# Patient Record
Sex: FEMALE | Race: BLACK OR AFRICAN AMERICAN | Employment: FULL TIME | URBAN - METROPOLITAN AREA
[De-identification: names, ages, dates, MRNs, and addresses within clinical notes are randomized per-mention and may not be internally consistent; named-entity substitution may affect disease eponyms.]

---

## 2020-09-10 ENCOUNTER — HOSPITAL ENCOUNTER (OUTPATIENT)
Age: 35
Setting detail: OBSERVATION
Discharge: HOME | End: 2020-09-11
Attending: EMERGENCY MEDICINE | Admitting: HOSPITALIST
Payer: COMMERCIAL

## 2020-09-10 ENCOUNTER — APPOINTMENT (OUTPATIENT)
Dept: ULTRASOUND IMAGING | Age: 35
End: 2020-09-10
Attending: PHYSICIAN ASSISTANT
Payer: COMMERCIAL

## 2020-09-10 DIAGNOSIS — D64.9 SYMPTOMATIC ANEMIA: Primary | ICD-10-CM

## 2020-09-10 DIAGNOSIS — N93.8 DUB (DYSFUNCTIONAL UTERINE BLEEDING): ICD-10-CM

## 2020-09-10 DIAGNOSIS — D50.9 IRON DEFICIENCY ANEMIA, UNSPECIFIED IRON DEFICIENCY ANEMIA TYPE: ICD-10-CM

## 2020-09-10 LAB
ALBUMIN SERPL-MCNC: 3.3 G/DL (ref 3.4–5)
ALBUMIN/GLOB SERPL: 0.8 {RATIO} (ref 0.8–1.7)
ALP SERPL-CCNC: 54 U/L (ref 45–117)
ALT SERPL-CCNC: 31 U/L (ref 13–56)
ANION GAP SERPL CALC-SCNC: 7 MMOL/L (ref 3–18)
APPEARANCE UR: ABNORMAL
AST SERPL-CCNC: 23 U/L (ref 10–38)
ATRIAL RATE: 97 BPM
BACTERIA URNS QL MICRO: ABNORMAL /HPF
BASOPHILS # BLD: 0.1 K/UL (ref 0–0.1)
BASOPHILS NFR BLD: 1 % (ref 0–2)
BILIRUB SERPL-MCNC: 0.3 MG/DL (ref 0.2–1)
BILIRUB UR QL: NEGATIVE
BUN SERPL-MCNC: 11 MG/DL (ref 7–18)
BUN/CREAT SERPL: 15 (ref 12–20)
CALCIUM SERPL-MCNC: 8.3 MG/DL (ref 8.5–10.1)
CALCULATED P AXIS, ECG09: 61 DEGREES
CALCULATED R AXIS, ECG10: 20 DEGREES
CALCULATED T AXIS, ECG11: 15 DEGREES
CHLORIDE SERPL-SCNC: 107 MMOL/L (ref 100–111)
CK MB CFR SERPL CALC: NORMAL % (ref 0–4)
CK MB SERPL-MCNC: <1 NG/ML (ref 5–25)
CK SERPL-CCNC: 102 U/L (ref 26–192)
CO2 SERPL-SCNC: 23 MMOL/L (ref 21–32)
COLOR UR: ABNORMAL
CREAT SERPL-MCNC: 0.75 MG/DL (ref 0.6–1.3)
DIAGNOSIS, 93000: NORMAL
DIFFERENTIAL METHOD BLD: ABNORMAL
EOSINOPHIL # BLD: 0.1 K/UL (ref 0–0.4)
EOSINOPHIL NFR BLD: 1 % (ref 0–5)
EPITH CASTS URNS QL MICRO: ABNORMAL /LPF (ref 0–5)
ERYTHROCYTE [DISTWIDTH] IN BLOOD BY AUTOMATED COUNT: 20 % (ref 11.6–14.5)
GLOBULIN SER CALC-MCNC: 3.9 G/DL (ref 2–4)
GLUCOSE SERPL-MCNC: 121 MG/DL (ref 74–99)
GLUCOSE UR STRIP.AUTO-MCNC: NEGATIVE MG/DL
HCG SERPL QL: NEGATIVE
HCT VFR BLD AUTO: 19.3 % (ref 35–45)
HCT VFR BLD AUTO: 21 % (ref 35–45)
HGB BLD-MCNC: 6 G/DL (ref 12–16)
HGB BLD-MCNC: 6.6 G/DL (ref 12–16)
HGB UR QL STRIP: ABNORMAL
HISTORY CHECKED?,CKHIST: NORMAL
HISTORY CHECKED?,CKHIST: NORMAL
KETONES UR QL STRIP.AUTO: NEGATIVE MG/DL
LEUKOCYTE ESTERASE UR QL STRIP.AUTO: NEGATIVE
LYMPHOCYTES # BLD: 1.6 K/UL (ref 0.9–3.6)
LYMPHOCYTES NFR BLD: 24 % (ref 21–52)
MCH RBC QN AUTO: 22.5 PG (ref 24–34)
MCHC RBC AUTO-ENTMCNC: 31.1 G/DL (ref 31–37)
MCV RBC AUTO: 72.3 FL (ref 74–97)
MONOCYTES # BLD: 0.5 K/UL (ref 0.05–1.2)
MONOCYTES NFR BLD: 7 % (ref 3–10)
MUCOUS THREADS URNS QL MICRO: ABNORMAL /LPF
NEUTS SEG # BLD: 4.2 K/UL (ref 1.8–8)
NEUTS SEG NFR BLD: 67 % (ref 40–73)
NITRITE UR QL STRIP.AUTO: NEGATIVE
P-R INTERVAL, ECG05: 132 MS
PH UR STRIP: 5 [PH] (ref 5–8)
PLATELET # BLD AUTO: 342 K/UL (ref 135–420)
PLATELET COMMENTS,PCOM: ABNORMAL
PMV BLD AUTO: 9 FL (ref 9.2–11.8)
POTASSIUM SERPL-SCNC: 4.1 MMOL/L (ref 3.5–5.5)
PROT SERPL-MCNC: 7.2 G/DL (ref 6.4–8.2)
PROT UR STRIP-MCNC: 30 MG/DL
Q-T INTERVAL, ECG07: 382 MS
QRS DURATION, ECG06: 80 MS
QTC CALCULATION (BEZET), ECG08: 485 MS
RBC # BLD AUTO: 2.67 M/UL (ref 4.2–5.3)
RBC #/AREA URNS HPF: ABNORMAL /HPF (ref 0–5)
RBC MORPH BLD: ABNORMAL
RETICS/RBC NFR AUTO: 2 % (ref 0.5–2.3)
SODIUM SERPL-SCNC: 137 MMOL/L (ref 136–145)
SP GR UR REFRACTOMETRY: 0 (ref 1–1.03)
SP GR UR REFRACTOMETRY: 1.02 (ref 1–1.03)
TROPONIN I SERPL-MCNC: <0.02 NG/ML (ref 0–0.04)
TSH SERPL DL<=0.05 MIU/L-ACNC: 2.21 UIU/ML (ref 0.36–3.74)
UROBILINOGEN UR QL STRIP.AUTO: 0.2 EU/DL (ref 0.2–1)
VENTRICULAR RATE, ECG03: 97 BPM
WBC # BLD AUTO: 6.5 K/UL (ref 4.6–13.2)
WBC URNS QL MICRO: ABNORMAL /HPF (ref 0–5)

## 2020-09-10 PROCEDURE — 86923 COMPATIBILITY TEST ELECTRIC: CPT

## 2020-09-10 PROCEDURE — 99218 HC RM OBSERVATION: CPT

## 2020-09-10 PROCEDURE — 96374 THER/PROPH/DIAG INJ IV PUSH: CPT

## 2020-09-10 PROCEDURE — 82550 ASSAY OF CK (CPK): CPT

## 2020-09-10 PROCEDURE — 85025 COMPLETE CBC W/AUTO DIFF WBC: CPT

## 2020-09-10 PROCEDURE — 82607 VITAMIN B-12: CPT

## 2020-09-10 PROCEDURE — 86900 BLOOD TYPING SEROLOGIC ABO: CPT

## 2020-09-10 PROCEDURE — 84443 ASSAY THYROID STIM HORMONE: CPT

## 2020-09-10 PROCEDURE — P9016 RBC LEUKOCYTES REDUCED: HCPCS

## 2020-09-10 PROCEDURE — 80053 COMPREHEN METABOLIC PANEL: CPT

## 2020-09-10 PROCEDURE — P9040 RBC LEUKOREDUCED IRRADIATED: HCPCS

## 2020-09-10 PROCEDURE — 85045 AUTOMATED RETICULOCYTE COUNT: CPT

## 2020-09-10 PROCEDURE — 74011250637 HC RX REV CODE- 250/637: Performed by: HOSPITALIST

## 2020-09-10 PROCEDURE — 86920 COMPATIBILITY TEST SPIN: CPT

## 2020-09-10 PROCEDURE — 82728 ASSAY OF FERRITIN: CPT

## 2020-09-10 PROCEDURE — 81001 URINALYSIS AUTO W/SCOPE: CPT

## 2020-09-10 PROCEDURE — 36430 TRANSFUSION BLD/BLD COMPNT: CPT

## 2020-09-10 PROCEDURE — 84703 CHORIONIC GONADOTROPIN ASSAY: CPT

## 2020-09-10 PROCEDURE — 74011250636 HC RX REV CODE- 250/636: Performed by: PHYSICIAN ASSISTANT

## 2020-09-10 PROCEDURE — 76830 TRANSVAGINAL US NON-OB: CPT

## 2020-09-10 PROCEDURE — 99285 EMERGENCY DEPT VISIT HI MDM: CPT

## 2020-09-10 PROCEDURE — 93005 ELECTROCARDIOGRAM TRACING: CPT

## 2020-09-10 PROCEDURE — 83540 ASSAY OF IRON: CPT

## 2020-09-10 PROCEDURE — 85018 HEMOGLOBIN: CPT

## 2020-09-10 RX ORDER — ONDANSETRON 2 MG/ML
4 INJECTION INTRAMUSCULAR; INTRAVENOUS
Status: COMPLETED | OUTPATIENT
Start: 2020-09-10 | End: 2020-09-10

## 2020-09-10 RX ORDER — SODIUM CHLORIDE 9 MG/ML
250 INJECTION, SOLUTION INTRAVENOUS AS NEEDED
Status: DISCONTINUED | OUTPATIENT
Start: 2020-09-10 | End: 2020-09-11 | Stop reason: HOSPADM

## 2020-09-10 RX ORDER — LANOLIN ALCOHOL/MO/W.PET/CERES
1 CREAM (GRAM) TOPICAL
Status: DISCONTINUED | OUTPATIENT
Start: 2020-09-10 | End: 2020-09-11 | Stop reason: HOSPADM

## 2020-09-10 RX ADMIN — FERROUS SULFATE TAB 325 MG (65 MG ELEMENTAL FE) 325 MG: 325 (65 FE) TAB at 21:06

## 2020-09-10 RX ADMIN — ONDANSETRON 4 MG: 2 INJECTION INTRAMUSCULAR; INTRAVENOUS at 13:31

## 2020-09-10 RX ADMIN — SODIUM CHLORIDE 1000 ML: 900 INJECTION, SOLUTION INTRAVENOUS at 13:31

## 2020-09-10 NOTE — ED PROVIDER NOTES
EMERGENCY DEPARTMENT HISTORY AND PHYSICAL EXAM    Date: 9/10/2020  Patient Name: Marcos Gorman    History of Presenting Illness     Chief Complaint   Patient presents with    Syncope         History Provided By: Patient    Chief Complaint: syncope    HPI(Context):   1:01 PM  Marcos Gorman is a 38 y.o. female with PMHX of irregular menses and Fe anemiawho presents to the emergency department C/O syncopal episode. Associated sxs include nausea and fatigue. Pt reports while training at work she began to fell generalized weakness and nausea. She reports syncopal episode with co-workers catching her and laying her on floor. Pt reports a few seconds of LOC. No head trauma. Pt reports irregular periods with daily vaginal bleeding for past 3 weeks. Heavy with clots this week but has since slowed down. Pt is not on birth control. Pt has hx of Fe def anemia but no hx of transfusion. 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 Pt denies fever, chills, vomiting, headache, numbness, unilateral weakness, tingling, chest pain, SOB, abdominal pain, LE swelling, hx of cardiac problems, seizures, incontinence, tongue biting, and any other sxs or complaints. Pt reports 1-2 cigs a month. Drinks 2-3 times a months on weekends typically 4-5 beers. No illicit drug use. Pt has appt to see GYN in [place] at 1:45pm tomorrow at [facility]. Dr. Rosina Archibald    PCP: None        Past History     Past Medical History:  History reviewed. No pertinent past medical history. Past Surgical History:  History reviewed. No pertinent surgical history. Family History:  History reviewed. No pertinent family history. Social History:  Social History     Tobacco Use    Smoking status: Current Some Day Smoker    Smokeless tobacco: Never Used   Substance Use Topics    Alcohol use: Yes    Drug use: Not on file       Allergies:   Allergies   Allergen Reactions    Pcn [Penicillins] Seizures Review of Systems   Review of Systems   Constitutional: Positive for fatigue. Negative for chills and fever. Respiratory: Negative for cough and shortness of breath. Cardiovascular: Negative for chest pain. Gastrointestinal: Positive for nausea. Negative for abdominal pain and vomiting. Genitourinary: Positive for menstrual problem (hx of irregular periods, vaginal bleeding x 3 weeks) and vaginal bleeding. Negative for dysuria and frequency. Neurological: Positive for syncope, weakness (generalized) and light-headedness. Negative for seizures and headaches. Hematological: Does not bruise/bleed easily. All other systems reviewed and are negative. Physical Exam     Vitals:    09/10/20 1300 09/10/20 1329   BP: 134/69 (!) 120/91   Pulse: 96 98   Resp: 12    Temp: 98.5 °F (36.9 °C)    SpO2: 100%    Weight: 68 kg (150 lb)    Height: 5' 2\" (1.575 m)      Physical Exam  Vitals signs and nursing note reviewed. Constitutional:       General: She is not in acute distress. Appearance: She is well-developed. She is not diaphoretic. Comments: AA female in NAD. Alert. HENT:      Head: Normocephalic and atraumatic. Jaw: No trismus. Right Ear: External ear normal.      Left Ear: External ear normal.      Nose: Nose normal.      Mouth/Throat:      Pharynx: Uvula midline. No oropharyngeal exudate or uvula swelling. Tonsils: No tonsillar abscesses. Eyes:      General: No scleral icterus. Right eye: No discharge. Left eye: No discharge. Conjunctiva/sclera: Conjunctivae normal.      Pupils: Pupils are equal, round, and reactive to light. Comments: Pale conjunctiva   Neck:      Musculoskeletal: Normal range of motion. Cardiovascular:      Rate and Rhythm: Normal rate and regular rhythm. Heart sounds: Normal heart sounds. No murmur. No friction rub. No gallop.     Pulmonary:      Effort: Pulmonary effort is normal. No tachypnea, accessory muscle usage or respiratory distress. Breath sounds: Normal breath sounds. No decreased breath sounds, wheezing, rhonchi or rales. Abdominal:      Palpations: Abdomen is soft. Tenderness: There is no abdominal tenderness. There is no right CVA tenderness, left CVA tenderness or guarding. Negative signs include McBurney's sign. Musculoskeletal: Normal range of motion. Right lower leg: No edema. Left lower leg: No edema. Skin:     General: Skin is warm and dry. Neurological:      Mental Status: She is alert and oriented to person, place, and time. Mental status is at baseline. GCS: GCS eye subscore is 4. GCS verbal subscore is 5. GCS motor subscore is 6. Cranial Nerves: Cranial nerves are intact. No dysarthria or facial asymmetry. Motor: Motor function is intact. No weakness. Coordination: Coordination is intact.    Psychiatric:         Behavior: Behavior normal.         Judgment: Judgment normal.             Diagnostic Study Results     Labs -     Recent Results (from the past 12 hour(s))   EKG, 12 LEAD, INITIAL    Collection Time: 09/10/20 12:56 PM   Result Value Ref Range    Ventricular Rate 97 BPM    Atrial Rate 97 BPM    P-R Interval 132 ms    QRS Duration 80 ms    Q-T Interval 382 ms    QTC Calculation (Bezet) 485 ms    Calculated P Axis 61 degrees    Calculated R Axis 20 degrees    Calculated T Axis 15 degrees    Diagnosis       Normal sinus rhythm  Possible Left atrial enlargement  Prolonged QT  Abnormal ECG  No previous ECGs available     CBC WITH AUTOMATED DIFF    Collection Time: 09/10/20  1:00 PM   Result Value Ref Range    WBC 6.5 4.6 - 13.2 K/uL    RBC 2.67 (L) 4.20 - 5.30 M/uL    HGB 6.0 (L) 12.0 - 16.0 g/dL    HCT 19.3 (L) 35.0 - 45.0 %    MCV 72.3 (L) 74.0 - 97.0 FL    MCH 22.5 (L) 24.0 - 34.0 PG    MCHC 31.1 31.0 - 37.0 g/dL    RDW 20.0 (H) 11.6 - 14.5 %    PLATELET 536 734 - 216 K/uL    MPV 9.0 (L) 9.2 - 11.8 FL    NEUTROPHILS 67 40 - 73 %    LYMPHOCYTES 24 21 - 52 %    MONOCYTES 7 3 - 10 %    EOSINOPHILS 1 0 - 5 %    BASOPHILS 1 0 - 2 %    ABS. NEUTROPHILS 4.2 1.8 - 8.0 K/UL    ABS. LYMPHOCYTES 1.6 0.9 - 3.6 K/UL    ABS. MONOCYTES 0.5 0.05 - 1.2 K/UL    ABS. EOSINOPHILS 0.1 0.0 - 0.4 K/UL    ABS. BASOPHILS 0.1 0.0 - 0.1 K/UL    PLATELET COMMENTS ADEQUATE PLATELETS      RBC COMMENTS ANISOCYTOSIS  3+        RBC COMMENTS POLYCHROMASIA  1+        RBC COMMENTS HYPOCHROMIA  2+        RBC COMMENTS POIKILOCYTOSIS  1+        DF AUTOMATED     METABOLIC PANEL, COMPREHENSIVE    Collection Time: 09/10/20  1:00 PM   Result Value Ref Range    Sodium 137 136 - 145 mmol/L    Potassium 4.1 3.5 - 5.5 mmol/L    Chloride 107 100 - 111 mmol/L    CO2 23 21 - 32 mmol/L    Anion gap 7 3.0 - 18 mmol/L    Glucose 121 (H) 74 - 99 mg/dL    BUN 11 7.0 - 18 MG/DL    Creatinine 0.75 0.6 - 1.3 MG/DL    BUN/Creatinine ratio 15 12 - 20      GFR est AA >60 >60 ml/min/1.73m2    GFR est non-AA >60 >60 ml/min/1.73m2    Calcium 8.3 (L) 8.5 - 10.1 MG/DL    Bilirubin, total 0.3 0.2 - 1.0 MG/DL    ALT (SGPT) 31 13 - 56 U/L    AST (SGOT) 23 10 - 38 U/L    Alk. phosphatase 54 45 - 117 U/L    Protein, total 7.2 6.4 - 8.2 g/dL    Albumin 3.3 (L) 3.4 - 5.0 g/dL    Globulin 3.9 2.0 - 4.0 g/dL    A-G Ratio 0.8 0.8 - 1.7     CARDIAC PANEL,(CK, CKMB & TROPONIN)    Collection Time: 09/10/20  1:00 PM   Result Value Ref Range    CK - MB <1.0 <3.6 ng/ml    CK-MB Index  0.0 - 4.0 %     CALCULATION NOT PERFORMED WHEN RESULT IS BELOW LINEAR LIMIT     26 - 192 U/L    Troponin-I, QT <0.02 0.0 - 0.045 NG/ML   TYPE & SCREEN    Collection Time: 09/10/20  1:00 PM   Result Value Ref Range    Crossmatch Expiration 09/13/2020     ABO/Rh(D) O POSITIVE     Antibody screen NEG     Comment repeat ABO and RH typing is not available     CALLED TO: 1 UNIT READY TO ED BY CAM ON 9/10/20 AT 1432.      Unit number [id number]     Blood component type [facility]     Unit division 00     Status of unit ALLOCATED     Crossmatch result Compatible    HCG QL SERUM    Collection Time: 09/10/20  1:00 PM   Result Value Ref Range    HCG, Ql. Negative NEG     TSH 3RD GENERATION    Collection Time: 09/10/20  1:00 PM   Result Value Ref Range    TSH 2.21 0.36 - 3.74 uIU/mL   URINALYSIS W/ RFLX MICROSCOPIC    Collection Time: 09/10/20  1:48 PM   Result Value Ref Range    Color ORANGE      Appearance CLOUDY      Specific gravity 1.019 1.005 - 1.030      Specific gravity 0.000 (L) 1.003 - 1.030      pH (UA) 5.0 5.0 - 8.0      Protein 30 (A) NEG mg/dL    Glucose Negative NEG mg/dL    Ketone Negative NEG mg/dL    Bilirubin Negative NEG      Blood LARGE (A) NEG      Urobilinogen 0.2 0.2 - 1.0 EU/dL    Nitrites Negative NEG      Leukocyte Esterase Negative NEG     URINE MICROSCOPIC ONLY    Collection Time: 09/10/20  1:48 PM   Result Value Ref Range    WBC 1 to 3 0 - 5 /hpf    RBC TOO NUMEROUS TO COUNT 0 - 5 /hpf    Epithelial cells 2+ 0 - 5 /lpf    Bacteria 2+ (A) NEG /hpf    Mucus 3+ (A) NEG /lpf   RBC, ALLOCATE    Collection Time: 09/10/20  2:15 PM   Result Value Ref Range    HISTORY CHECKED? Historical check performed      US TRANSVAGINAL W DOPPLER   Final Result   IMPRESSION: Heterogeneous, thickened, nonspecific appearance of the endometrium. Uncertain if this is related to blood clots in the endometrial canal versus   underlying lesion such as an endometrial polyp. Consider sonographic follow-up. Hysteroscopic evaluation/direct visualization could be considered. CT Results  (Last 48 hours)    None        CXR Results  (Last 48 hours)    None          Medications given in the ED-  Medications   0.9% sodium chloride infusion 250 mL (has no administration in time range)   sodium chloride 0.9 % bolus infusion 1,000 mL (1,000 mL IntraVENous New Bag 9/10/20 1331)   ondansetron (ZOFRAN) injection 4 mg (4 mg IntraVENous Given 9/10/20 1331)         Medical Decision Making   I am the first provider for this patient.     I reviewed the vital signs, available nursing notes, past medical history, past surgical history, family history and social history. Vital Signs-Reviewed the patient's vital signs. Pulse Oximetry Analysis - 100% on RA. NORMAL     Records Reviewed: Nursing Notes    Provider Notes (Medical Decision Making): arrhythmia, anemia, dehydration, hypoglycemia, metabolic derangement, vertigo, labyrinthitis. Doubt PE. No evidence of CVA/TIA. No reported seizure activity. Procedures:  Procedures    ED Course:   1:01 PM Initial assessment performed. The patients presenting problems have been discussed, and they are in agreement with the care plan formulated and outlined with them. I have encouraged them to ask questions as they arise throughout their visit. FACE-TO-FACE PROGRESS NOTE:  1:58 PM  The patient presents with couple episodes in the setting of prolonged vaginal bleeding for the past 3 weeks. My exam shows resting comfortably in no distress but does look fatigued, heart regular rate and rhythm. Imp/plan: Labs reveal a significantly low hemoglobin of 6.0. Patient will require admission for blood transfusion and stabilization of critically low hemoglobin. Discussed this extensively with patient and went over consent. She understands and agrees with this plan. .   I have personally seen and examined the patient, reviewed the ANABELL's note and agree with findings and plan. Written by Dr. Rhiannon Delvalle    Diagnosis and Disposition       2:31 PM Consult: I discussed care with Olga Watt MD (on call medicine) It was a standard discussion, including history of patients chief complaint, available diagnostic results, and treatment course. 2:54 PM Consult: I discussed care with Dr. Maria Teresa Raymundo (on call GYN) It was a standard discussion, including history of patients chief complaint, available diagnostic results, and treatment course. Will see patient in hospital    Discussed with attending who has seen and evaluated patient. 1. Symptomatic anemia    2.  Iron deficiency anemia, unspecified iron deficiency anemia type    3. DUB (dysfunctional uterine bleeding)        PLAN:  1. Admit to medicine    CRITICAL CARE NOTE:  2:18 PM  I have spent 34 minutes of critical care time involved in lab review, consultations with specialist, family decision-making, and documentation. During this entire length of time I was immediately available to the patient. Critical Care: The reason for providing this level of medical care for this critically ill patient was due a critical illness that impaired one or more vital organ systems such that there was a high probability of imminent or life threatening deterioration in the patients condition. This care involved high complexity decision making to assess, manipulate, and support vital system functions, to treat this degreee vital organ system failure and to prevent further life threatening deterioration of the patients condition. _______________________________    Attestations: This note is prepared by Shahzad Up PA-C.  _______________________________          Please note that this dictation was completed with [employer], the computer voice recognition software. Quite often unanticipated grammatical, syntax, homophones, and other interpretive errors are inadvertently transcribed by the computer software. Please disregard these errors. Please excuse any errors that have escaped final proofreading.

## 2020-09-10 NOTE — PROGRESS NOTES
VERBAL shift change report given to Perlita Hollis RN (oncoming nurse) by Fer Cheney RN (offgoing nurse). Report included the following information SBAR, Intake/Output, MAR and Recent Results.

## 2020-09-10 NOTE — H&P
History & Physical    Patient: Gayle Pereira MRN: [id number]  CSN: [id number]    YOB: 1985  Age: 38 y.o. Sex: female      DOA: 9/10/2020  Primary Care Provider:  None      Assessment/Plan     Patient Active Problem List   Diagnosis Code    DUB (dysfunctional uterine bleeding) N93.8    Fe deficiency anemia D50.9    Symptomatic anemia D64.9       Admit to medical floor    Symptomatic anemia-  Blood transfusion ordered. Follow H&H and transfuse as needed. Check iron profile, ferritin, reticulocyte count, B12 folate. Started on iron supplement. RAMYA Carrillo consulted by ER. DVT prophylaxis with SCD        Estimated length of stay : 1 to 2 days    CC: Fainting episode       HPI:     Gayle Pereira is a 38 y.o. female with no significant past history presents to ER with concerns of fainting episode. Patient reports that she has been having her menstrual bleeding for the past 3 weeks. She has been using tampons and on top of that she is requiring to change 3-4 pads a day. Her bleeding has been heavy with clots. This morning when she woke up she felt dizzy and tired however she went to work and she reports that at work she had fainting episode and the next thing she noticed was she was on the floor. EMS was called and she was brought to ER. She reports that she has been having regular cycles until now. Her menstrual cycle lasts about 8 days. Denies any past irregular menstrual problem. She used to be on birth control pills but not anymore. In ER her H&H noted to be 6/19.3. History reviewed. No pertinent past medical history. History reviewed. No pertinent surgical history. History reviewed. No pertinent family history.     Social History     Socioeconomic History    Marital status:      Spouse name: Not on file    Number of children: Not on file    Years of education: Not on file    Highest education level: Not on file   Tobacco Use    Smoking status: Current Some Day Smoker    Smokeless tobacco: Never Used   Substance and Sexual Activity    Alcohol use: Yes       Prior to Admission medications    Not on File       Allergies   Allergen Reactions    Pcn [Penicillins] Seizures       Review of Systems  Gen: No fever, chills, malaise, weight loss/gain. See above  Heent: No headache, rhinorrhea, epistaxis, ear pain, hearing loss, sinus pain, neck pain/stiffness, sore throat. Heart: No chest pain, palpitations, MCCULLOUGH, pnd, or orthopnea. Resp: No cough, hemoptysis, wheezing and shortness of breath. GI: No nausea, vomiting, diarrhea, constipation, melena or hematochezia. : see above   Derm: No rash, new skin lesion or pruritis. Musc/skeletal: no bone or joint complains. Vasc: No edema, cyanosis or claudication. Endo: No heat/cold intolerance, no polyuria,polydipsia or polyphagia. Neuro: No unilateral weakness, numbness, tingling. No seizures. Heme: see above. Physical Exam:     Physical Exam:  Visit Vitals  /81   Pulse 91   Temp 98.6 °F (37 °C)   Resp 16   Ht 5' 2\" (1.575 m)   Wt 68 kg (150 lb)   SpO2 100%   BMI 27.44 kg/m²      O2 Device: Room air    Temp (24hrs), Av.5 °F (36.9 °C), Min:98.2 °F (36.8 °C), Max:98.8 °F (37.1 °C)    09/10 0701 - 09/10 1900  In: 500   Out: -    No intake/output data recorded. General:  Awake, cooperative, no distress. Head:  Normocephalic, without obvious abnormality, atraumatic. Eyes:  Conjunctivae/corneas clear, sclera anicteric, PERRL, EOMs intact. Nose: Nares normal. No drainage or sinus tenderness. Throat: Lips, mucosa, and tongue normal.    Neck: Supple, symmetrical, trachea midline, no adenopathy. Lungs:   Clear to auscultation bilaterally. Heart:   S1, S2, no murmur, click, rub or gallop. Abdomen: Soft, non-tender. Bowel sounds normal. No masses,  No organomegaly. Extremities: Extremities normal, atraumatic, no cyanosis or edema.  Capillary refill normal.   Pulses: 2+ and symmetric all extremities. Skin: Skin color pink, turgor normal. No rashes or lesions   Neurologic: CNII-XII intact. No focal motor or sensory deficit. Labs Reviewed:    CMP:   Lab Results   Component Value Date/Time     09/10/2020 01:00 PM    K 4.1 09/10/2020 01:00 PM     09/10/2020 01:00 PM    CO2 23 09/10/2020 01:00 PM    AGAP 7 09/10/2020 01:00 PM     (H) 09/10/2020 01:00 PM    BUN 11 09/10/2020 01:00 PM    CREA 0.75 09/10/2020 01:00 PM    GFRAA >60 09/10/2020 01:00 PM    GFRNA >60 09/10/2020 01:00 PM    CA 8.3 (L) 09/10/2020 01:00 PM    ALB 3.3 (L) 09/10/2020 01:00 PM    TP 7.2 09/10/2020 01:00 PM    GLOB 3.9 09/10/2020 01:00 PM    AGRAT 0.8 09/10/2020 01:00 PM    ALT 31 09/10/2020 01:00 PM     CBC:   Lab Results   Component Value Date/Time    WBC 6.5 09/10/2020 01:00 PM    HGB 6.0 (L) 09/10/2020 01:00 PM    HCT 19.3 (L) 09/10/2020 01:00 PM     09/10/2020 01:00 PM         Procedures/imaging: see electronic medical records for all procedures/Xrays and details which were not copied into this note but were reviewed prior to creation of Plan    Please note that this dictation was completed with [employer], the [employer] voice recognition software. Quite often unanticipated grammatical, syntax, homophones, and other interpretive errors are inadvertently transcribed by the computer software. Please disregard these errors. Please excuse any errors that have escaped final proofreading.         CC: None

## 2020-09-10 NOTE — PROGRESS NOTES
Assumed care of patient at this time. Report received from CHELSY Owens. Patient in bed. Pain 0/10, menu provided to patient to order dinner. Bed in lowest position HOB 30 degrees call bell in reach. Blood Transfusion continues. Dual Verification complete by Mehdi Wei RN and Staci Childs RN. Patient Alert and Oriented X 4. Bilateral lung sounds CTA, radial/pedal  pulses present, abdomen soft and non-distended. Bowel sounds active, Right AC 18 G Intact, patent and infusing. No signs of phlebitis/infiltration noted. Skin warm/dry/intact.

## 2020-09-10 NOTE — ROUTINE PROCESS
TRANSFER - OUT REPORT: 
 
Verbal report given to Gabino Bhakta RN(name) on Marylou Figueredo  being transferred to [facility](unit) for routine progression of care Report consisted of patients Situation, Background, Assessment and  
Recommendations(SBAR). Information from the following report(s) SBAR, Kardex, ED Summary, [facility] and Recent Results was reviewed with the receiving nurse. Lines:  
Peripheral IV 09/10/20 Right Antecubital (Active) Site Assessment Clean, dry, & intact 09/10/20 1308 Phlebitis Assessment 0 09/10/20 1308 Infiltration Assessment 0 09/10/20 1308 Dressing Status Clean, dry, & intact 09/10/20 1308 Dressing Type Tape;Transparent 09/10/20 1308 Hub Color/Line Status Pink;Flushed;Patent 09/10/20 1308 Action Taken Blood drawn 09/10/20 1308 Opportunity for questions and clarification was provided. Patient transported with: 
 Registered Nurse

## 2020-09-10 NOTE — ED TRIAGE NOTES
Patient with syncopal episode this am.   Patient also states that she has been having vaginal bleeding for over one month

## 2020-09-11 VITALS
TEMPERATURE: 98.2 F | HEART RATE: 80 BPM | OXYGEN SATURATION: 100 % | BODY MASS INDEX: 27.05 KG/M2 | DIASTOLIC BLOOD PRESSURE: 88 MMHG | HEIGHT: 62 IN | SYSTOLIC BLOOD PRESSURE: 148 MMHG | RESPIRATION RATE: 17 BRPM | WEIGHT: 147 LBS

## 2020-09-11 LAB
ABO + RH BLD: NORMAL
BLD PROD TYP BPU: NORMAL
BLD PROD TYP BPU: NORMAL
BLOOD BANK CMNT PATIENT-IMP: NORMAL
BLOOD GROUP ANTIBODIES SERPL: NORMAL
BPU ID: NORMAL
BPU ID: NORMAL
CALLED TO:,BCALL1: NORMAL
CALLED TO:,BCALL2: NORMAL
CROSSMATCH RESULT,%XM: NORMAL
CROSSMATCH RESULT,%XM: NORMAL
FERRITIN SERPL-MCNC: 3 NG/ML (ref 8–388)
FOLATE SERPL-MCNC: 15.3 NG/ML (ref 3.1–17.5)
HCT VFR BLD AUTO: 23.7 % (ref 35–45)
HGB BLD-MCNC: 7.6 G/DL (ref 12–16)
IRON SATN MFR SERPL: 26 % (ref 20–50)
IRON SERPL-MCNC: 109 UG/DL (ref 50–175)
SPECIMEN EXP DATE BLD: NORMAL
STATUS OF UNIT,%ST: NORMAL
STATUS OF UNIT,%ST: NORMAL
TIBC SERPL-MCNC: 416 UG/DL (ref 250–450)
UNIT DIVISION, %UDIV: 0
UNIT DIVISION, %UDIV: 0
VIT B12 SERPL-MCNC: 494 PG/ML (ref 211–911)

## 2020-09-11 PROCEDURE — 99218 HC RM OBSERVATION: CPT

## 2020-09-11 PROCEDURE — 74011250637 HC RX REV CODE- 250/637: Performed by: HOSPITALIST

## 2020-09-11 PROCEDURE — 36415 COLL VENOUS BLD VENIPUNCTURE: CPT

## 2020-09-11 PROCEDURE — 85018 HEMOGLOBIN: CPT

## 2020-09-11 RX ORDER — DOCUSATE SODIUM 100 MG/1
100 CAPSULE, LIQUID FILLED ORAL 2 TIMES DAILY
Qty: 60 CAP | Refills: 0 | Status: SHIPPED | OUTPATIENT
Start: 2020-09-11 | End: 2020-12-10

## 2020-09-11 RX ORDER — ONDANSETRON 4 MG/1
4 TABLET, ORALLY DISINTEGRATING ORAL
Qty: 12 TAB | Refills: 1 | Status: SHIPPED | OUTPATIENT
Start: 2020-09-11

## 2020-09-11 RX ORDER — NORETHINDRONE ACETATE AND ETHINYL ESTRADIOL 1; .02 MG/1; MG/1
1 TABLET ORAL DAILY
Qty: 90 TAB | Refills: 3 | Status: SHIPPED | OUTPATIENT
Start: 2020-09-11

## 2020-09-11 RX ORDER — LANOLIN ALCOHOL/MO/W.PET/CERES
325 CREAM (GRAM) TOPICAL 2 TIMES DAILY
Qty: 60 TAB | Refills: 0 | Status: SHIPPED | OUTPATIENT
Start: 2020-09-11

## 2020-09-11 RX ADMIN — FERROUS SULFATE TAB 325 MG (65 MG ELEMENTAL FE) 325 MG: 325 (65 FE) TAB at 09:32

## 2020-09-11 RX ADMIN — FERROUS SULFATE TAB 325 MG (65 MG ELEMENTAL FE) 325 MG: 325 (65 FE) TAB at 12:28

## 2020-09-11 NOTE — PROGRESS NOTES
1950- Bedside and Verbal shift change report given to [address] (oncoming nurse) by Neville Hernandez (offgoing nurse). Report included the following information SBAR, Kardex, Intake/Output, MAR and Recent Results. Shift Summary-   Patient Vitals for the past 12 hrs:   Temp Pulse Resp BP SpO2   09/11/20 0412 98.8 °F (37.1 °C) 82 16 135/82 100 %   09/11/20 0150 98.7 °F (37.1 °C) 81 16 127/84 100 %   09/11/20 0124 98 °F (36.7 °C) 88 16 118/72 100 %   09/11/20 0029 98.1 °F (36.7 °C) 92 16 134/79 100 %   09/10/20 2329 98.7 °F (37.1 °C) 85 16 141/81 100 %   09/10/20 2312 98.3 °F (36.8 °C) 82 16 136/83 100 %   09/10/20 1916 98.6 °F (37 °C) 100 18 119/90 100 %    Pt denied pain/discomfort. Pt Hgb 6.6 at start of shift, received 1 unit of PRBC's. Hgb after transfusion 7.6    0720- Bedside and Verbal shift change report given to MIKE Sanchez (oncoming nurse) by [address] (offgoing nurse). Report included the following information SBAR, Kardex, Intake/Output, MAR and Recent Results.

## 2020-09-11 NOTE — PROGRESS NOTES
Reason for Admission:   Irregular menses, syncope episode                   RUR Score:                     Plan for utilizing home health:     TBD     PCP: First and Last name:  None listed   Name of Practice:    Are you a current patient: Yes/No:    Approximate date of last visit:    Can you participate in a virtual visit with your PCP:                     Current Advanced Directive/Advance Care Plan: not on chart at this time                           Transition of Care Plan:   Chart reviewed per ED note pt arrived to ED with c/o irregular menses for 3 weeks and near syncope episode at work, pt admitted for observation, OBGYN on case, cm will cont to follow and review case for any d/c needs. [facility]- cm met and spoke with pt at bedside, introduced self and explained cm role as d/c planner, pt independent with care no cm needs at this time, cms arranged new pcp visit. Care Management Interventions  PCP Verified by CM:  Yes  Palliative Care Criteria Met (RRAT>21 & CHF Dx)?: No  Mode of Transport at Discharge: Self  Current Support Network: Relative's Home  Confirm Follow Up Transport: Self  The Plan for Transition of Care is Related to the Following Treatment Goals : return home with family

## 2020-09-11 NOTE — DISCHARGE SUMMARY
Discharge Summary    Patient: Vicente Georges MRN: [id number]  CSN: [id number]    YOB: 1985  Age: 38 y.o. Sex: female    DOA: 9/10/2020 LOS:  LOS: 0 days   Discharge Date:      Primary Care Provider:  None    Admission Diagnoses: Symptomatic anemia [D64.9]  DUB (dysfunctional uterine bleeding) [N93.8]  Fe deficiency anemia [D50.9]    Discharge Diagnoses:    Problem List as of 9/11/2020 Never Reviewed          Codes Class Noted - Resolved    DUB (dysfunctional uterine bleeding) ICD-10-CM: N93.8  ICD-9-CM: 626.8  9/10/2020 - Present        Fe deficiency anemia ICD-10-CM: D50.9  ICD-9-CM: 280.9  9/10/2020 - Present        * (Principal) Symptomatic anemia ICD-10-CM: D64.9  ICD-9-CM: 285.9  9/10/2020 - Present              Discharge Medications:     Current Discharge Medication List      START taking these medications    Details   norethindrone-ethinyl estradiol (Junel 1/20, 21,) 1-20 mg-mcg tablet Take 1 Tab by mouth daily. Indications: abnormal bleeding from the uterus, abnormally long or heavy periods  Qty: 90 Tab, Refills: 3      ondansetron (Zofran ODT) 4 mg disintegrating tablet Take 1 Tab by mouth every eight (8) hours as needed for Nausea or Vomiting. Qty: 12 Tab, Refills: 1      ferrous sulfate 325 mg (65 mg iron) tablet Take 1 Tab by mouth two (2) times a day. Qty: 60 Tab, Refills: 0      docusate sodium (Colace) 100 mg capsule Take 1 Cap by mouth two (2) times a day for 90 days. Qty: 60 Cap, Refills: 0             Discharge Condition: Good    Procedures : none    Consults: Gynecology      PHYSICAL EXAM   Visit Vitals  /88   Pulse 80   Temp 98.2 °F (36.8 °C)   Resp 17   Ht 5' 2\" (1.575 m)   Wt 66.7 kg (147 lb)   SpO2 100%   BMI 26.89 kg/m²     General: Awake, cooperative, no acute distress    HEENT: NC, Atraumatic. PERRLA, EOMI. Anicteric sclerae. Lungs:  CTA Bilaterally. No Wheezing/Rhonchi/Rales.   Heart:  Regular  rhythm,  No murmur, No Rubs, No Gallops  Abdomen: Soft, Non distended, Non tender. +Bowel sounds,   Extremities: No c/c/e  Psych:   Not anxious or agitated. Neurologic:  No acute neurological deficits. Admission HPI :   Dex Conde is a 38 y.o. female with no significant past history presents to ER with concerns of fainting episode. Patient reports that she has been having her menstrual bleeding for the past 3 weeks. She has been using tampons and on top of that she is requiring to change 3-4 pads a day. Her bleeding has been heavy with clots. This morning when she woke up she felt dizzy and tired however she went to work and she reports that at work she had fainting episode and the next thing she noticed was she was on the floor. EMS was called and she was brought to ER. She reports that she has been having regular cycles until now. Her menstrual cycle lasts about 8 days. Denies any past irregular menstrual problem. She used to be on birth control pills but not anymore. In ER her H&H noted to be 6/19. 3. Hospital Course :   Ms. Inez Mullins was admitted to medical floor, she was seen by Gyn, she was started on OCP, prescription given and she will follow up with Gyn as outpatient. She received blood transfusion for her anemia, her H/H improved with blood transfusion. She is started on iron supplements, will discharge on iron supplements.       Activity: Activity as tolerated    Diet: Regular Diet    Follow-up: PCP, Gyn    Disposition: home    Minutes spent on discharge: 40       Labs: Results:       Chemistry Recent Labs     09/10/20  1300   *      K 4.1      CO2 23   BUN 11   CREA 0.75   CA 8.3*   AGAP 7   BUCR 15   AP 54   TP 7.2   ALB 3.3*   GLOB 3.9   AGRAT 0.8      CBC w/Diff Recent Labs     09/11/20  0330 09/10/20  2030 09/10/20  1300   WBC  --   --  6.5   RBC  --   --  2.67*   HGB 7.6* 6.6* 6.0*   HCT 23.7* 21.0* 19.3*   PLT  --   --  342   GRANS  --   --  67   LYMPH  --   --  24   EOS  --   --  1 Cardiac Enzymes Recent Labs     09/10/20  1300      CKND1 CALCULATION NOT PERFORMED WHEN RESULT IS BELOW LINEAR LIMIT      Coagulation No results for input(s): PTP, INR, APTT, INREXT in the last 72 hours. Lipid Panel No results found for: CHOL, CHOLPOCT, CHOLX, CHLST, CHOLV, 531259, HDL, HDLP, LDL, LDLC, DLDLP, 794730, VLDLC, VLDL, TGLX, TRIGL, TRIGP, TGLPOCT, CHHD, CHHDX   BNP No results for input(s): BNPP in the last 72 hours. Liver Enzymes Recent Labs     09/10/20  1300   TP 7.2   ALB 3.3*   AP 54      Thyroid Studies Lab Results   Component Value Date/Time    TSH 2.21 09/10/2020 01:00 PM            Significant Diagnostic Studies: Us Transvaginal W Doppler    Result Date: 9/10/2020  Ultrasound Pelvis: Transvaginal INDICATION: Pelvic bleeding, anemia. COMPARISON: None. TECHNIQUE: Real-time transvaginal  sonography in multiple planes was performed with image documentation. Grayscale, color flow Doppler imaging, and velocity spectral waveform analysis of the ovaries  was performed (duplex imaging). FINDINGS: UTERUS: Normal in size and echotexture measuring 7.6 x 4 x 4.5 cm. No fibroids or masses identified. ENDOMETRIUM: Thickened up to 1.7 cm with heterogeneous appearance. RIGHT OVARY and ADNEXA: Right ovary is normal in size and echotexture measuring 3.6 x 2 x 3.5 cm. No ovarian or adnexal masses identified. Color and spectral Doppler demonstrate normal flow to the right ovary with no evidence of ovarian torsion. The systolic and venous waveforms are within normal limits. LEFT OVARY and ADNEXA: Left ovary is normal in size and echotexture measuring 3.5 x 2.4 x 3.1 cm. No ovarian or adnexal masses identified. Color and spectral Doppler demonstrate normal flow to the left ovary with no evidence of ovarian torsion. The systolic and venous waveforms are within normal limits. . OTHER: No free fluid identified. IMPRESSION: Heterogeneous, thickened, nonspecific appearance of the endometrium.  Uncertain if this is related to blood clots in the endometrial canal versus underlying lesion such as an endometrial polyp. Consider sonographic follow-up. Hysteroscopic evaluation/direct visualization could be considered. No results found for this or any previous visit. Please note that this dictation was completed with [employer], the computer voice recognition software. Quite often unanticipated grammatical, syntax, homophones, and other interpretive errors are inadvertently transcribed by the computer software. Please disregard these errors. Please excuse any errors that have escaped final proofreading.      CC: None

## 2020-09-11 NOTE — PROGRESS NOTES
5007 Received bedside shift report from offgoing nurse RN. Report included the following information SBAR, Kardex, Intake/Output, MAR and Recent Results. 1240 Discharge teaching provided and that included new meds and safety precautions. IV removed clean, dry and intact.

## 2021-06-25 ENCOUNTER — APPOINTMENT (RX ONLY)
Dept: URBAN - METROPOLITAN AREA CLINIC 288 | Facility: CLINIC | Age: 36
Setting detail: DERMATOLOGY
End: 2021-06-25

## 2021-06-25 DIAGNOSIS — L70.0 ACNE VULGARIS: ICD-10-CM | Status: WORSENING

## 2021-06-25 DIAGNOSIS — L24 IRRITANT CONTACT DERMATITIS: ICD-10-CM | Status: WORSENING

## 2021-06-25 PROBLEM — L24.9 IRRITANT CONTACT DERMATITIS, UNSPECIFIED CAUSE: Status: ACTIVE | Noted: 2021-06-25

## 2021-06-25 PROCEDURE — 99204 OFFICE O/P NEW MOD 45 MIN: CPT

## 2021-06-25 PROCEDURE — ? PRESCRIPTION

## 2021-06-25 PROCEDURE — ? COUNSELING

## 2021-06-25 PROCEDURE — ? TREATMENT REGIMEN

## 2021-06-25 PROCEDURE — ? FULL BODY SKIN EXAM - DECLINED

## 2021-06-25 PROCEDURE — ? SCREENING FOR COVID-19

## 2021-06-25 PROCEDURE — ? PRESCRIPTION MEDICATION MANAGEMENT

## 2021-06-25 RX ORDER — DAPSONE 50 MG/G
GEL TOPICAL QD
Qty: 1 | Refills: 4 | Status: ERX | COMMUNITY
Start: 2021-06-25

## 2021-06-25 RX ORDER — TAZAROTENE 0.05 MG/G
CREAM CUTANEOUS QHS
Qty: 1 | Refills: 3 | Status: ERX | COMMUNITY
Start: 2021-06-25

## 2021-06-25 RX ORDER — TRIAMCINOLONE ACETONIDE 0.25 MG/G
OINTMENT TOPICAL BID
Qty: 1 | Refills: 2 | Status: ERX | COMMUNITY
Start: 2021-06-25

## 2021-06-25 RX ADMIN — TRIAMCINOLONE ACETONIDE: 0.25 OINTMENT TOPICAL at 00:00

## 2021-06-25 RX ADMIN — DAPSONE: 50 GEL TOPICAL at 00:00

## 2021-06-25 RX ADMIN — TAZAROTENE: 0.05 CREAM CUTANEOUS at 00:00

## 2021-06-25 ASSESSMENT — LOCATION SIMPLE DESCRIPTION DERM
LOCATION SIMPLE: LEFT CHEEK
LOCATION SIMPLE: LEFT EYEBROW

## 2021-06-25 ASSESSMENT — LOCATION DETAILED DESCRIPTION DERM
LOCATION DETAILED: LEFT LATERAL EYEBROW
LOCATION DETAILED: LEFT INFERIOR CENTRAL MALAR CHEEK

## 2021-06-25 ASSESSMENT — LOCATION ZONE DERM: LOCATION ZONE: FACE

## 2021-06-25 NOTE — PROCEDURE: PRESCRIPTION MEDICATION MANAGEMENT
Plan: -f/u in 2month if not better
Detail Level: Zone
Render In Strict Bullet Format?: No
Continue Regimen: Aczone QD\\nTazorac 0.05 nightly as tolerated
Initiate Treatment: TAC 0.025% ointment

## 2021-06-25 NOTE — PROCEDURE: MIPS QUALITY
Quality 226: Preventive Care And Screening: Tobacco Use: Screening And Cessation Intervention: Patient screened for tobacco use and is an ex/non-smoker
Quality 130: Documentation Of Current Medications In The Medical Record: Current Medications Documented
Detail Level: Detailed
Quality 431: Preventive Care And Screening: Unhealthy Alcohol Use - Screening: Patient screened for unhealthy alcohol use using a single question and scores less than 2 times per year
Quality 128: Preventive Care And Screening: Body Mass Index (Bmi) Screening And Follow-Up Plan: BMI not documented, reason not otherwise specified.
Quality 110: Preventive Care And Screening: Influenza Immunization: Influenza Immunization Administered during Influenza season
normal...

## 2021-06-25 NOTE — PROCEDURE: COUNSELING
Erythromycin Pregnancy And Lactation Text: This medication is Pregnancy Category B and is considered safe during pregnancy. It is also excreted in breast milk.
Topical Retinoid Pregnancy And Lactation Text: This medication is Pregnancy Category C. It is unknown if this medication is excreted in breast milk.
High Dose Vitamin A Pregnancy And Lactation Text: High dose vitamin A therapy is contraindicated during pregnancy and breast feeding.
Bactrim Counseling:  I discussed with the patient the risks of sulfa antibiotics including but not limited to GI upset, allergic reaction, drug rash, diarrhea, dizziness, photosensitivity, and yeast infections.  Rarely, more serious reactions can occur including but not limited to aplastic anemia, agranulocytosis, methemoglobinemia, blood dyscrasias, liver or kidney failure, lung infiltrates or desquamative/blistering drug rashes.
Sarecycline Pregnancy And Lactation Text: This medication is Pregnancy Category D and not consider safe during pregnancy. It is also excreted in breast milk.
Bpo Recommendations: CeraVe Acne wash, Panoxyl wash
Dapsone Counseling: I discussed with the patient the risks of dapsone including but not limited to hemolytic anemia, agranulocytosis, rashes, methemoglobinemia, kidney failure, peripheral neuropathy, headaches, GI upset, and liver toxicity.  Patients who start dapsone require monitoring including baseline LFTs and weekly CBCs for the first month, then every month thereafter.  The patient verbalized understanding of the proper use and possible adverse effects of dapsone.  All of the patient's questions and concerns were addressed.
Topical Clindamycin Pregnancy And Lactation Text: This medication is Pregnancy Category B and is considered safe during pregnancy. It is unknown if it is excreted in breast milk.
Erythromycin Counseling:  I discussed with the patient the risks of erythromycin including but not limited to GI upset, allergic reaction, drug rash, diarrhea, increase in liver enzymes, and yeast infections.
Isotretinoin Counseling: Patient should get monthly blood tests, not donate blood, not drive at night if vision affected, not share medication, and not undergo elective surgery for 6 months after tx completed. Side effects reviewed, pt to contact office should one occur.
Benzoyl Peroxide Counseling: Patient counseled that medicine may cause skin irritation and bleach clothing.  In the event of skin irritation, the patient was advised to reduce the amount of the drug applied or use it less frequently.   The patient verbalized understanding of the proper use and possible adverse effects of benzoyl peroxide.  All of the patient's questions and concerns were addressed.
Use Enhanced Medication Counseling?: No
Topical Sulfur Applications Counseling: Topical Sulfur Counseling: Patient counseled that this medication may cause skin irritation or allergic reactions.  In the event of skin irritation, the patient was advised to reduce the amount of the drug applied or use it less frequently.   The patient verbalized understanding of the proper use and possible adverse effects of topical sulfur application.  All of the patient's questions and concerns were addressed.
Dapsone Pregnancy And Lactation Text: This medication is Pregnancy Category C and is not considered safe during pregnancy or breast feeding.
Minocycline Counseling: Patient advised regarding possible photosensitivity and discoloration of the teeth, skin, lips, tongue and gums.  Patient instructed to avoid sunlight, if possible.  When exposed to sunlight, patients should wear protective clothing, sunglasses, and sunscreen.  The patient was instructed to call the office immediately if the following severe adverse effects occur:  hearing changes, easy bruising/bleeding, severe headache, or vision changes.  The patient verbalized understanding of the proper use and possible adverse effects of minocycline.  All of the patient's questions and concerns were addressed.
Tazorac Counseling:  Patient advised that medication is irritating and drying.  Patient may need to apply sparingly and wash off after an hour before eventually leaving it on overnight.  The patient verbalized understanding of the proper use and possible adverse effects of tazorac.  All of the patient's questions and concerns were addressed.
Spironolactone Counseling: Patient advised regarding risks of diarrhea, abdominal pain, hyperkalemia, birth defects (for female patients), liver toxicity and renal toxicity. The patient may need blood work to monitor liver and kidney function and potassium levels while on therapy. The patient verbalized understanding of the proper use and possible adverse effects of spironolactone.  All of the patient's questions and concerns were addressed.
Bactrim Pregnancy And Lactation Text: This medication is Pregnancy Category D and is known to cause fetal risk.  It is also excreted in breast milk.
Tetracycline Counseling: Patient counseled regarding possible photosensitivity and increased risk for sunburn.  Patient instructed to avoid sunlight, if possible.  When exposed to sunlight, patients should wear protective clothing, sunglasses, and sunscreen.  The patient was instructed to call the office immediately if the following severe adverse effects occur:  hearing changes, easy bruising/bleeding, severe headache, or vision changes.  The patient verbalized understanding of the proper use and possible adverse effects of tetracycline.  All of the patient's questions and concerns were addressed. Patient understands to avoid pregnancy while on therapy due to potential birth defects.
Doxycycline Pregnancy And Lactation Text: This medication is Pregnancy Category D and not consider safe during pregnancy. It is also excreted in breast milk but is considered safe for shorter treatment courses.
Isotretinoin Pregnancy And Lactation Text: This medication is Pregnancy Category X and is considered extremely dangerous during pregnancy. It is unknown if it is excreted in breast milk.
Moisturizer Recommendations: CeraVe moisturizer
Benzoyl Peroxide Pregnancy And Lactation Text: This medication is Pregnancy Category C. It is unknown if benzoyl peroxide is excreted in breast milk.
Azithromycin Counseling:  I discussed with the patient the risks of azithromycin including but not limited to GI upset, allergic reaction, drug rash, diarrhea, and yeast infections.
Cleanser Recommendations: CeraVe hydrating cleanser or foaming cleanser
Birth Control Pills Counseling: Birth Control Pill Counseling: I discussed with the patient the potential side effects of OCPs including but not limited to increased risk of stroke, heart attack, thrombophlebitis, deep venous thrombosis, hepatic adenomas, breast changes, GI upset, headaches, and depression.  The patient verbalized understanding of the proper use and possible adverse effects of OCPs. All of the patient's questions and concerns were addressed.
Doxycycline Counseling:  Patient counseled regarding possible photosensitivity and increased risk for sunburn.  Patient instructed to avoid sunlight, if possible.  When exposed to sunlight, patients should wear protective clothing, sunglasses, and sunscreen.  The patient was instructed to call the office immediately if the following severe adverse effects occur:  hearing changes, easy bruising/bleeding, severe headache, or vision changes.  The patient verbalized understanding of the proper use and possible adverse effects of doxycycline.  All of the patient's questions and concerns were addressed.
Spironolactone Pregnancy And Lactation Text: This medication can cause feminization of the male fetus and should be avoided during pregnancy. The active metabolite is also found in breast milk.
Tazorac Pregnancy And Lactation Text: This medication is not safe during pregnancy. It is unknown if this medication is excreted in breast milk.
Topical Sulfur Applications Pregnancy And Lactation Text: This medication is Pregnancy Category C and has an unknown safety profile during pregnancy. It is unknown if this topical medication is excreted in breast milk.
Sunscreen Recommendations: EltaMD UV clear
Topical Retinoid counseling:  Patient advised to apply a pea-sized amount only at bedtime and wait 30 minutes after washing their face before applying.  If too drying, patient may add a non-comedogenic moisturizer. The patient verbalized understanding of the proper use and possible adverse effects of retinoids.  All of the patient's questions and concerns were addressed.
Sarecycline Counseling: Patient advised regarding possible photosensitivity and discoloration of the teeth, skin, lips, tongue and gums.  Patient instructed to avoid sunlight, if possible.  When exposed to sunlight, patients should wear protective clothing, sunglasses, and sunscreen.  The patient was instructed to call the office immediately if the following severe adverse effects occur:  hearing changes, easy bruising/bleeding, severe headache, or vision changes.  The patient verbalized understanding of the proper use and possible adverse effects of sarecycline.  All of the patient's questions and concerns were addressed.
Azithromycin Pregnancy And Lactation Text: This medication is considered safe during pregnancy and is also secreted in breast milk.
High Dose Vitamin A Counseling: Side effects reviewed, pt to contact office should one occur.
Topical Clindamycin Counseling: Patient counseled that this medication may cause skin irritation or allergic reactions.  In the event of skin irritation, the patient was advised to reduce the amount of the drug applied or use it less frequently.   The patient verbalized understanding of the proper use and possible adverse effects of clindamycin.  All of the patient's questions and concerns were addressed.
Detail Level: Zone
Birth Control Pills Pregnancy And Lactation Text: This medication should be avoided if pregnant and for the first 30 days post-partum.

## 2021-06-29 RX ORDER — TRIAMCINOLONE ACETONIDE 0.25 MG/G
OINTMENT TOPICAL BID
Qty: 1 | Refills: 2 | Status: ERX

## 2021-06-29 RX ORDER — DAPSONE 50 MG/G
GEL TOPICAL QD
Qty: 1 | Refills: 4 | Status: ERX

## 2021-06-29 RX ORDER — TAZAROTENE 0.05 MG/G
CREAM CUTANEOUS QHS
Qty: 1 | Refills: 3 | Status: ERX

## 2021-07-15 ENCOUNTER — RX ONLY (OUTPATIENT)
Age: 36
Setting detail: RX ONLY
End: 2021-07-15

## 2021-07-15 RX ORDER — DAPSONE 75 MG/G
GEL TOPICAL
Qty: 1 | Refills: 2 | Status: ERX | COMMUNITY
Start: 2021-07-15

## 2022-03-18 PROBLEM — N93.8 DUB (DYSFUNCTIONAL UTERINE BLEEDING): Status: ACTIVE | Noted: 2020-09-10

## 2022-03-19 PROBLEM — D50.9 FE DEFICIENCY ANEMIA: Status: ACTIVE | Noted: 2020-09-10

## 2022-03-19 PROBLEM — D64.9 SYMPTOMATIC ANEMIA: Status: ACTIVE | Noted: 2020-09-10

## 2022-06-16 ENCOUNTER — TRANSCRIBE ORDER (OUTPATIENT)
Dept: SCHEDULING | Age: 37
End: 2022-06-16

## 2022-06-16 DIAGNOSIS — S06.0X0A CONCUSSION WITH NO LOSS OF CONSCIOUSNESS: Primary | ICD-10-CM

## 2022-06-17 ENCOUNTER — HOSPITAL ENCOUNTER (OUTPATIENT)
Dept: CT IMAGING | Age: 37
Discharge: HOME | End: 2022-06-17
Attending: NURSE PRACTITIONER
Payer: COMMERCIAL

## 2022-06-17 DIAGNOSIS — S06.0X0A CONCUSSION WITH NO LOSS OF CONSCIOUSNESS: ICD-10-CM

## 2022-06-17 PROCEDURE — 70450 CT HEAD/BRAIN W/O DYE: CPT

## 2024-02-08 NOTE — CONSULTS
Gynecology Consult    Name: Jovanna Torres MRN: [id number] SSN: [id number]    YOB: 1985  Age: 38 y.o. Sex: female      Assessment:     Abnormal uterine bleeding with acute blood loss anemia, s/p transfusion with appropriate rise. Currently asymptomatic. Plan:       Discussed options for management. As patient has had success with oral contraceptive pills in the past, will discharge on OCP taper. Detailed instructions provided to patient. She will start with three tablets a day for 3 days, then 2 tablets daily for 2 days, and then resume a daily tablet. I have asked her to skip the placebo pills and go right into the next pack when the first one finishes. She will followup with me in the office in two weeks. Thank you for this interesting consultation. Will be happy to follow this delightful young lady as an outpatient. Subjective:      Chief complaint:  Abnormal uterine bleeding, Acute Blood Loss Anemia, Menorrhagia and Metrorrhagia    Ghazala Cabrear is a 38 y.o.  0,  female with a history of abnormal uterine bleeding. Ultrasound findings show thickened endometrium. Previous treatment measures include oral contraceptives. Pt reports long h/o irregular cycles but no previous transfusion. She had a syncopal episode at work and was brought in by EMS and noted to be anemic with Hgb of 6. She has not satisfied parity. H/o infertility. Pt denies smoking, alcohol, or drug use. The current method of family planning is none. History reviewed. No pertinent past medical history. History reviewed. No pertinent surgical history. OB History    No obstetric history on file.        Allergies   Allergen Reactions    Pcn [Penicillins] Seizures     Current Facility-Administered Medications   Medication Dose Route Frequency Provider Last Rate Last Dose    0.9% sodium chloride infusion 250 mL  250 mL IntraVENous PRN Pete Barbosa PA-C        ferrous sulfate tablet 325 mg  1 Tab Oral TID WITH MEALS Ladi Barakat MD   325 mg at 09/10/20 2106       History reviewed. No pertinent family history. Social History     Socioeconomic History    Marital status:      Spouse name: Not on file    Number of children: Not on file    Years of education: Not on file    Highest education level: Not on file   Occupational History    Not on file   Social Needs    Financial resource strain: Not on file    Food insecurity     Worry: Not on file     Inability: Not on file    Transportation needs     Medical: Not on file     Non-medical: Not on file   Tobacco Use    Smoking status: Current Some Day Smoker    Smokeless tobacco: Never Used   Substance and Sexual Activity    Alcohol use: Yes    Drug use: Not on file    Sexual activity: Not on file   Lifestyle    Physical activity     Days per week: Not on file     Minutes per session: Not on file    Stress: Not on file   Relationships    Social connections     Talks on phone: Not on file     Gets together: Not on file     Attends [employer] service: Not on file     Active member of club or organization: Not on file     Attends meetings of clubs or organizations: Not on file     Relationship status: Not on file    Intimate partner violence     Fear of current or ex partner: Not on file     Emotionally abused: Not on file     Physically abused: Not on file     Forced sexual activity: Not on file   Other Topics Concern    Not on file   Social History Narrative    Not on file       Review of Systems:  A comprehensive review of systems was negative except for that written in the History of Present Illness.      Objective:     Vitals:    09/11/20 0124 09/11/20 0150 09/11/20 0412 09/11/20 0828   BP: 118/72 127/84 135/82 148/88   Pulse: 88 81 82 80   Resp: 16 16 16 17   Temp: 98 °F (36.7 °C) 98.7 °F (37.1 °C) 98.8 °F (37.1 °C) 98.2 °F (36.8 °C)   SpO2: 100% 100% 100% 100%   Weight:       Height:           General:  alert, cooperative, no distress, appears stated age   Skin:  Normal. and no rash or abnormalities   Eyes: negative   Mouth: MMM no lesions   Lymph Nodes:  Cervical, supraclavicular, and axillary nodes normal.   Lungs:  clear to auscultation bilaterally   Heart:  regular rate and rhythm, S1, S2 normal, no murmur, click, rub or gallop   Abdomen: soft, non-tender.  Bowel sounds normal. No masses,  no organomegaly   CVA:  absent   Genitourinary: defer exam   Extremities:  extremities normal, atraumatic, no cyanosis or edema   Neurologic:  negative   Psychiatric:  non focal         Rafita Larson MD 8:36 AM 9/11/2020 No

## 2024-10-31 ENCOUNTER — HOSPITAL ENCOUNTER (OUTPATIENT)
Facility: HOSPITAL | Age: 39
Discharge: HOME | End: 2024-11-03
Attending: OBSTETRICS & GYNECOLOGY
Payer: COMMERCIAL

## 2024-10-31 DIAGNOSIS — Z91.89 OTHER SPECIFIED PERSONAL RISK FACTORS, NOT ELSEWHERE CLASSIFIED: ICD-10-CM

## 2024-10-31 PROCEDURE — A9585 GADOBUTROL INJECTION: HCPCS | Performed by: OBSTETRICS & GYNECOLOGY

## 2024-10-31 PROCEDURE — C8908 MRI W/O FOL W/CONT, BREAST,: HCPCS

## 2024-10-31 PROCEDURE — 6360000004 HC RX CONTRAST MEDICATION: Performed by: OBSTETRICS & GYNECOLOGY

## 2024-10-31 RX ORDER — GADOBUTROL 604.72 MG/ML
7 INJECTION INTRAVENOUS
Status: COMPLETED | OUTPATIENT
Start: 2024-10-31 | End: 2024-10-31

## 2024-10-31 RX ADMIN — GADOBUTROL 7 ML: 604.72 INJECTION INTRAVENOUS at 11:30

## 2024-12-05 ENCOUNTER — ANESTHESIA EVENT (OUTPATIENT)
Facility: HOSPITAL | Age: 39
End: 2024-12-05
Payer: COMMERCIAL

## 2024-12-06 ENCOUNTER — HOSPITAL ENCOUNTER (EMERGENCY)
Facility: HOSPITAL | Age: 39
Discharge: HOME | End: 2024-12-06
Attending: STUDENT IN AN ORGANIZED HEALTH CARE EDUCATION/TRAINING PROGRAM
Payer: COMMERCIAL

## 2024-12-06 ENCOUNTER — HOSPITAL ENCOUNTER (OUTPATIENT)
Facility: HOSPITAL | Age: 39
Setting detail: OUTPATIENT SURGERY
Discharge: STILL ADMITTED OR ADMINISTRATIVE | End: 2024-12-06
Attending: INTERNAL MEDICINE | Admitting: INTERNAL MEDICINE
Payer: COMMERCIAL

## 2024-12-06 ENCOUNTER — ANESTHESIA (OUTPATIENT)
Facility: HOSPITAL | Age: 39
End: 2024-12-06
Payer: COMMERCIAL

## 2024-12-06 VITALS
DIASTOLIC BLOOD PRESSURE: 143 MMHG | SYSTOLIC BLOOD PRESSURE: 209 MMHG | HEIGHT: 62 IN | OXYGEN SATURATION: 99 % | RESPIRATION RATE: 15 BRPM | BODY MASS INDEX: 28.52 KG/M2 | HEART RATE: 91 BPM | WEIGHT: 155 LBS

## 2024-12-06 VITALS
TEMPERATURE: 98 F | DIASTOLIC BLOOD PRESSURE: 128 MMHG | RESPIRATION RATE: 21 BRPM | SYSTOLIC BLOOD PRESSURE: 191 MMHG | HEART RATE: 77 BPM | OXYGEN SATURATION: 99 %

## 2024-12-06 DIAGNOSIS — I15.9 SECONDARY HYPERTENSION: Primary | ICD-10-CM

## 2024-12-06 LAB
ALBUMIN SERPL-MCNC: 3.7 G/DL (ref 3.5–5)
ALBUMIN/GLOB SERPL: 0.9 (ref 1.1–2.2)
ALP SERPL-CCNC: 58 U/L (ref 45–117)
ALT SERPL-CCNC: 22 U/L (ref 12–78)
ANION GAP SERPL CALC-SCNC: 5 MMOL/L (ref 2–12)
AST SERPL-CCNC: 21 U/L (ref 15–37)
BASOPHILS # BLD: 0 K/UL (ref 0–0.1)
BASOPHILS NFR BLD: 1 % (ref 0–1)
BILIRUB SERPL-MCNC: 1.3 MG/DL (ref 0.2–1)
BUN SERPL-MCNC: 9 MG/DL (ref 6–20)
BUN/CREAT SERPL: 13 (ref 12–20)
CALCIUM SERPL-MCNC: 8.4 MG/DL (ref 8.5–10.1)
CHLORIDE SERPL-SCNC: 105 MMOL/L (ref 97–108)
CO2 SERPL-SCNC: 27 MMOL/L (ref 21–32)
CREAT SERPL-MCNC: 0.69 MG/DL (ref 0.55–1.02)
DIFFERENTIAL METHOD BLD: ABNORMAL
EOSINOPHIL # BLD: 0.1 K/UL (ref 0–0.4)
EOSINOPHIL NFR BLD: 1 % (ref 0–7)
ERYTHROCYTE [DISTWIDTH] IN BLOOD BY AUTOMATED COUNT: 15.7 % (ref 11.5–14.5)
GLOBULIN SER CALC-MCNC: 4.2 G/DL (ref 2–4)
GLUCOSE SERPL-MCNC: 80 MG/DL (ref 65–100)
HCT VFR BLD AUTO: 37.8 % (ref 35–47)
HGB BLD-MCNC: 12 G/DL (ref 11.5–16)
IMM GRANULOCYTES # BLD AUTO: 0 K/UL (ref 0–0.04)
IMM GRANULOCYTES NFR BLD AUTO: 0 % (ref 0–0.5)
LYMPHOCYTES # BLD: 1.2 K/UL (ref 0.8–3.5)
LYMPHOCYTES NFR BLD: 23 % (ref 12–49)
MCH RBC QN AUTO: 26.4 PG (ref 26–34)
MCHC RBC AUTO-ENTMCNC: 31.7 G/DL (ref 30–36.5)
MCV RBC AUTO: 83.3 FL (ref 80–99)
MONOCYTES # BLD: 0.4 K/UL (ref 0–1)
MONOCYTES NFR BLD: 8 % (ref 5–13)
NEUTS SEG # BLD: 3.4 K/UL (ref 1.8–8)
NEUTS SEG NFR BLD: 67 % (ref 32–75)
NRBC # BLD: 0 K/UL (ref 0–0.01)
NRBC BLD-RTO: 0 PER 100 WBC
PLATELET # BLD AUTO: 236 K/UL (ref 150–400)
PMV BLD AUTO: 9 FL (ref 8.9–12.9)
POTASSIUM SERPL-SCNC: 4.6 MMOL/L (ref 3.5–5.1)
PROT SERPL-MCNC: 7.9 G/DL (ref 6.4–8.2)
RBC # BLD AUTO: 4.54 M/UL (ref 3.8–5.2)
SODIUM SERPL-SCNC: 137 MMOL/L (ref 136–145)
WBC # BLD AUTO: 5.1 K/UL (ref 3.6–11)

## 2024-12-06 PROCEDURE — 2580000003 HC RX 258: Performed by: INTERNAL MEDICINE

## 2024-12-06 PROCEDURE — 36415 COLL VENOUS BLD VENIPUNCTURE: CPT

## 2024-12-06 PROCEDURE — 99283 EMERGENCY DEPT VISIT LOW MDM: CPT

## 2024-12-06 PROCEDURE — 85025 COMPLETE CBC W/AUTO DIFF WBC: CPT

## 2024-12-06 PROCEDURE — 6370000000 HC RX 637 (ALT 250 FOR IP): Performed by: STUDENT IN AN ORGANIZED HEALTH CARE EDUCATION/TRAINING PROGRAM

## 2024-12-06 PROCEDURE — 80053 COMPREHEN METABOLIC PANEL: CPT

## 2024-12-06 RX ORDER — SODIUM CHLORIDE 9 MG/ML
INJECTION, SOLUTION INTRAVENOUS PRN
Status: DISCONTINUED | OUTPATIENT
Start: 2024-12-06 | End: 2024-12-06 | Stop reason: HOSPADM

## 2024-12-06 RX ORDER — SODIUM CHLORIDE 0.9 % (FLUSH) 0.9 %
5-40 SYRINGE (ML) INJECTION EVERY 12 HOURS SCHEDULED
Status: DISCONTINUED | OUTPATIENT
Start: 2024-12-06 | End: 2024-12-06 | Stop reason: HOSPADM

## 2024-12-06 RX ORDER — AMLODIPINE BESYLATE 5 MG/1
5 TABLET ORAL DAILY
Qty: 30 TABLET | Refills: 0 | Status: SHIPPED | OUTPATIENT
Start: 2024-12-06

## 2024-12-06 RX ORDER — SODIUM CHLORIDE 0.9 % (FLUSH) 0.9 %
5-40 SYRINGE (ML) INJECTION PRN
Status: DISCONTINUED | OUTPATIENT
Start: 2024-12-06 | End: 2024-12-06 | Stop reason: HOSPADM

## 2024-12-06 RX ORDER — AMLODIPINE BESYLATE 5 MG/1
5 TABLET ORAL
Status: COMPLETED | OUTPATIENT
Start: 2024-12-06 | End: 2024-12-06

## 2024-12-06 RX ADMIN — AMLODIPINE BESYLATE 5 MG: 5 TABLET ORAL at 11:07

## 2024-12-06 RX ADMIN — SODIUM CHLORIDE: 9 INJECTION, SOLUTION INTRAVENOUS at 09:50

## 2024-12-06 ASSESSMENT — PAIN SCALES - GENERAL: PAINLEVEL_OUTOF10: 0

## 2024-12-06 ASSESSMENT — PAIN - FUNCTIONAL ASSESSMENT
PAIN_FUNCTIONAL_ASSESSMENT: 0-10
PAIN_FUNCTIONAL_ASSESSMENT: 0-10

## 2024-12-06 NOTE — H&P
G I Procedure Note           Endoscopy History and Physical           Dr. Giuliano Parker      [facility]                Whit Hurtado [id number]  [id number]    1985  39 y.o.  female      Date of Procedure:   Preoperative Diagnosis:       Procedure:   [unfilled]      FH: colon cancer [Z80.0]                         Procedure(s):  COLONOSCOPY      Gastroenterologist:  Anesthesia:           Giuliano Parker MD                               Monitor Anesthesia Care            History and procedure indication:  Whit Hurtado is a 39 y.o. Black /  female who presents with: FH: colon cancer [Z80.0]   including the additional history of Screening and Family Hstory of Cancer ,Screening for colon cancer,,        History reviewed. No pertinent past medical history.   Prior to Admission medications    Not on File     Allergies   Allergen Reactions    Penicillins Seizure       History reviewed. No pertinent surgical history.  History reviewed. No pertinent family history.   Social History     Tobacco Use    Smoking status: Former     Types: Cigarettes    Smokeless tobacco: Never   Substance Use Topics    Alcohol use: Yes     Comment: occ                                                      PHYSICAL EXAM   There were no vitals taken for this visit.    General appearance:  alert,  in no distress  Mental status:  normal mood, behavior, speech, dress, motor activity and thought processes  Nose:      normal and patent, no erythema, discharge    Mouth:- mucous membranes moist, pharynx normal without lesions                  [x]  No Loose teeth      []    Loose teeth  Finger opening:  []1     []1.5    [] 2     [] 2.5     [x] 3      [] 3.5     [] 4   Mallampati:         [] Class 1     [x] Class 2    [] Class 3      [] Class 4      Neck - supple,      [x] Full ROM [] Decreased ROM  [] Short Neck no significant adenopathy    Chest - clear to

## 2024-12-06 NOTE — PERIOP NOTE
0950: Patient in pre-op for endoscopy procedure. Presenting with hypertension 214/128, 209/143.  Case cancelled per anesthesia.   Patient to be admitted to [facility] ED. Patient verbalizes understanding.     1000: Patient's spouse notified and at bedside at this time.

## 2024-12-08 NOTE — ED PROVIDER NOTES
[facility] EMERGENCY DEPT  EMERGENCY DEPARTMENT ENCOUNTER       Pt Name: Whit Hurtado  MRN: [id number]  Birthdate 1985  Date of evaluation: 12/6/2024  Provider: Clovis Johnson DO   PCP: Hemalatha Ernandez MD  Note Started: 11:37 AM 12/8/24     CHIEF COMPLAINT       Chief Complaint   Patient presents with    Hypertension     Pt wheeled to triage from endoscopy. Pt was to receive a colonoscopy today when she received her pre-procedure vitals pts BP was systolic 200's consistently. Pt is asymptomatic and states she has a family hx of HTN but never been diagnosed.        HISTORY OF PRESENT ILLNESS: 1 or more elements      History From: Patient  None     Whit Hurtado is a 39 y.o. female who presents with cc of asymptomatic hypertension. Was supposed to get a colonoscopy today but pre-procedure vitals were BP 200s. She has no symptoms. She has a family hx of high blood pressure but has never been diagnosed. She has a PCP appointment next week.     Nursing Notes were all reviewed and agreed with or any disagreements were addressed in the HPI.       PAST HISTORY     Past Medical History:  No past medical history on file.      Past Surgical History:  No past surgical history on file.    Family History:  No family history on file.    Social History:  Social History     Tobacco Use    Smoking status: Former     Types: Cigarettes    Smokeless tobacco: Never   Substance Use Topics    Alcohol use: Yes     Comment: occ    Drug use: Never       Allergies:  Allergies   Allergen Reactions    Penicillins Seizure       CURRENT MEDICATIONS      Discharge Medication List as of 12/6/2024 12:55 PM            PHYSICAL EXAM      ED Triage Vitals [12/06/24 1031]   Encounter Vitals Group      BP (!) 218/123      Systolic BP Percentile       Diastolic BP Percentile       Pulse 91      Respirations 20      Temp 98 °F (36.7 °C)      Temp Source Oral      SpO2 98 %      Weight       Height       Head Circumference       Peak Flow       Pain

## 2025-02-23 SDOH — HEALTH STABILITY: PHYSICAL HEALTH: ON AVERAGE, HOW MANY DAYS PER WEEK DO YOU ENGAGE IN MODERATE TO STRENUOUS EXERCISE (LIKE A BRISK WALK)?: 2 DAYS

## 2025-02-23 SDOH — HEALTH STABILITY: PHYSICAL HEALTH: ON AVERAGE, HOW MANY MINUTES DO YOU ENGAGE IN EXERCISE AT THIS LEVEL?: 30 MIN

## 2025-02-24 NOTE — PROGRESS NOTES
2025    Whit Hurtado (:  1985) is a 39 y.o. female, here for a preventive medicine evaluation.    Subjective   Patient Active Problem List   Diagnosis    DUB (dysfunctional uterine bleeding)    Symptomatic anemia    Fe deficiency anemia     Patient is new to this provider    PMHx: HTN    Cervical Cancer Screening   Patient is due for cervical cancer screening.  She states that she follows with [facility] (Dr. Krishnamurthy).  She says that her last Pap test was in 2024 with NILM findings.    Breast Cancer Screening  Patient is due for breast cancer screening.  She states that she had a mammogram in 2024 with normal findings.    Hypertension  Patient has a PMHx of HTN being managed with the medication listed below.  Her BP is noted to be elevated at 163/118.    Current medication: amlodipine 5 mg    Joint Pain   Neck Pain  Patient endorses having a burning joint pain in the shoulders, knees, ankles, and wrists bilaterally for the past 4 years.  She also states having pain down the back of the neck.  Patient describes the pain as sharp, but denies any swelling, redness, or pain with touching the joints.  She mentions that it has been a slow progression with worsening over the last 6 months.  Patient denies any known family history of autoimmune disorders.      Review of Systems   Constitutional:  Negative for activity change and appetite change.   HENT:  Negative for congestion.    Respiratory:  Negative for cough and shortness of breath.    Musculoskeletal:  Positive for arthralgias, neck pain and neck stiffness.   Skin:  Negative for rash.   Neurological:  Negative for numbness.   Psychiatric/Behavioral:  Negative for dysphoric mood.    All other systems reviewed and are negative.      Prior to Visit Medications    Medication Sig Taking? Authorizing Provider   amLODIPine (NORVASC) 5 MG tablet Take 1 tablet by mouth daily  Clovis Johnson, DO        Allergies

## 2025-02-26 ENCOUNTER — OFFICE VISIT (OUTPATIENT)
Facility: CLINIC | Age: 40
End: 2025-02-26

## 2025-02-26 VITALS
RESPIRATION RATE: 20 BRPM | HEART RATE: 80 BPM | BODY MASS INDEX: 29.7 KG/M2 | TEMPERATURE: 98.2 F | HEIGHT: 62 IN | WEIGHT: 161.4 LBS | OXYGEN SATURATION: 100 % | DIASTOLIC BLOOD PRESSURE: 118 MMHG | SYSTOLIC BLOOD PRESSURE: 163 MMHG

## 2025-02-26 DIAGNOSIS — Z00.00 ENCOUNTER FOR PREVENTIVE HEALTH EXAMINATION: ICD-10-CM

## 2025-02-26 DIAGNOSIS — I10 PRIMARY HYPERTENSION: ICD-10-CM

## 2025-02-26 DIAGNOSIS — Z76.89 ENCOUNTER TO ESTABLISH CARE: Primary | ICD-10-CM

## 2025-02-26 DIAGNOSIS — Z11.4 SCREENING FOR HIV WITHOUT PRESENCE OF RISK FACTORS: ICD-10-CM

## 2025-02-26 DIAGNOSIS — D50.9 IRON DEFICIENCY ANEMIA, UNSPECIFIED IRON DEFICIENCY ANEMIA TYPE: ICD-10-CM

## 2025-02-26 DIAGNOSIS — Z11.59 NEED FOR HEPATITIS C SCREENING TEST: ICD-10-CM

## 2025-02-26 DIAGNOSIS — M54.2 NECK PAIN: ICD-10-CM

## 2025-02-26 DIAGNOSIS — M25.50 ARTHRALGIA, UNSPECIFIED JOINT: ICD-10-CM

## 2025-02-26 RX ORDER — LOSARTAN POTASSIUM AND HYDROCHLOROTHIAZIDE 12.5; 5 MG/1; MG/1
1 TABLET ORAL DAILY
Qty: 90 TABLET | Refills: 1 | Status: SHIPPED | OUTPATIENT
Start: 2025-02-26

## 2025-02-26 RX ORDER — CYCLOBENZAPRINE HCL 10 MG
10 TABLET ORAL NIGHTLY PRN
Qty: 10 TABLET | Refills: 0 | Status: SHIPPED | OUTPATIENT
Start: 2025-02-26 | End: 2025-03-08

## 2025-02-26 RX ORDER — MELOXICAM 7.5 MG/1
7.5 TABLET ORAL DAILY PRN
Qty: 30 TABLET | Refills: 1 | Status: SHIPPED | OUTPATIENT
Start: 2025-02-26

## 2025-02-26 SDOH — ECONOMIC STABILITY: FOOD INSECURITY: WITHIN THE PAST 12 MONTHS, THE FOOD YOU BOUGHT JUST DIDN'T LAST AND YOU DIDN'T HAVE MONEY TO GET MORE.: NEVER TRUE

## 2025-02-26 SDOH — ECONOMIC STABILITY: FOOD INSECURITY: WITHIN THE PAST 12 MONTHS, YOU WORRIED THAT YOUR FOOD WOULD RUN OUT BEFORE YOU GOT MONEY TO BUY MORE.: NEVER TRUE

## 2025-02-26 ASSESSMENT — PATIENT HEALTH QUESTIONNAIRE - PHQ9
SUM OF ALL RESPONSES TO PHQ9 QUESTIONS 1 & 2: 0
SUM OF ALL RESPONSES TO PHQ QUESTIONS 1-9: 0
SUM OF ALL RESPONSES TO PHQ QUESTIONS 1-9: 0
2. FEELING DOWN, DEPRESSED OR HOPELESS: NOT AT ALL
SUM OF ALL RESPONSES TO PHQ QUESTIONS 1-9: 0
SUM OF ALL RESPONSES TO PHQ QUESTIONS 1-9: 0
1. LITTLE INTEREST OR PLEASURE IN DOING THINGS: NOT AT ALL

## 2025-02-26 ASSESSMENT — ENCOUNTER SYMPTOMS
SHORTNESS OF BREATH: 0
COUGH: 0

## 2025-02-26 NOTE — ASSESSMENT & PLAN NOTE
-PMHx of HTN  -/118  -Current medications: Amlodipine 5 mg  -D/C amlodipine 5 mg  -will start losartan-HCTZ 50-12.5 mg  -Provided with educational resources regarding HTN management  -Provided with [facility] BP flowsheet    Orders:    losartan-hydroCHLOROthiazide (HYZAAR) 50-12.5 MG per tablet; Take 1 tablet by mouth daily    [facility] Blood Pressure Flowsheet

## 2025-02-26 NOTE — ASSESSMENT & PLAN NOTE
-Endorses diffuse B/L arthralgia  -Worsening x 6 months  -Labs & imaging ordered  -Will send anti-inflammatory    Orders:    MARLON, Rflx to 5-Biomarker Profile(LUCAS); Future    Anti-DNA Antibody, Double-Stranded; Future    Anti-Scl-70 Ab (RDL); Future    CCP Antibodies, IGG/IGA; Future    Rheumatoid Arthritis Diagnostic Panel; Future    Sedimentation Rate; Future    Sjogren's Ab (SS-A, SS-B); Future    XR SHOULDER RIGHT (MIN 2 VIEWS); Future    XR SHOULDER LEFT (MIN 2 VIEWS); Future    Uric Acid; Future    cyclobenzaprine (FLEXERIL) 10 MG tablet; Take 1 tablet by mouth nightly as needed for Muscle spasms    meloxicam (MOBIC) 7.5 MG tablet; Take 1 tablet by mouth daily as needed for Pain

## 2025-02-26 NOTE — ASSESSMENT & PLAN NOTE
-Labs ordered    Orders:    Vitamin D 25 Hydroxy; Future    Hemoglobin A1C; Future    T4, Free; Future    TSH; Future    Lipid Panel; Future    HIV 1/2 Ag/Ab, 4TH Generation,W Rflx Confirm; Future    Hepatitis C Antibody; Future    Ferritin; Future    Iron and TIBC; Future    Vitamin B12; Future    Folate; Future

## 2025-02-26 NOTE — PROGRESS NOTES
Chief Complaint   Patient presents with    Establish Care    Hypertension    Shoulder Pain     Burning pain rates pain 6-10\10

## 2025-02-27 LAB
25(OH)D3 SERPL-MCNC: <9 NG/ML (ref 30–100)
CHOLEST SERPL-MCNC: 220 MG/DL
ERYTHROCYTE [SEDIMENTATION RATE] IN BLOOD: 34 MM/HR (ref 0–20)
EST. AVERAGE GLUCOSE BLD GHB EST-MCNC: 108 MG/DL
FERRITIN SERPL-MCNC: 24 NG/ML (ref 8–252)
FOLATE SERPL-MCNC: 14.5 NG/ML (ref 5–21)
HBA1C MFR BLD: 5.4 % (ref 4–5.6)
HCV AB SER IA-ACNC: 0.1 INDEX
HCV AB SERPL QL IA: NONREACTIVE
HDLC SERPL-MCNC: 76 MG/DL
HDLC SERPL: 2.9 (ref 0–5)
HIV 1+2 AB+HIV1 P24 AG SERPL QL IA: NONREACTIVE
HIV 1/2 RESULT COMMENT: NORMAL
IRON SATN MFR SERPL: 7 % (ref 20–50)
IRON SERPL-MCNC: 30 UG/DL (ref 35–150)
LDLC SERPL CALC-MCNC: 132.8 MG/DL (ref 0–100)
T4 FREE SERPL-MCNC: 1 NG/DL (ref 0.8–1.5)
TIBC SERPL-MCNC: 448 UG/DL (ref 250–450)
TRIGL SERPL-MCNC: 56 MG/DL
TSH SERPL DL<=0.05 MIU/L-ACNC: 3.08 UIU/ML (ref 0.36–3.74)
URATE SERPL-MCNC: 4.5 MG/DL (ref 2.6–6)
VIT B12 SERPL-MCNC: 500 PG/ML (ref 193–986)
VLDLC SERPL CALC-MCNC: 11.2 MG/DL

## 2025-02-28 DIAGNOSIS — D50.9 IRON DEFICIENCY ANEMIA, UNSPECIFIED IRON DEFICIENCY ANEMIA TYPE: Primary | ICD-10-CM

## 2025-02-28 DIAGNOSIS — E55.9 VITAMIN D DEFICIENCY: Primary | ICD-10-CM

## 2025-02-28 LAB
ANA SER QL: NEGATIVE
DSDNA AB SER-ACNC: <1 IU/ML (ref 0–9)
ENA SS-A AB SER-ACNC: <0.2 AI (ref 0–0.9)
ENA SS-B AB SER-ACNC: <0.2 AI (ref 0–0.9)

## 2025-02-28 RX ORDER — FERROUS SULFATE 325(65) MG
325 TABLET, DELAYED RELEASE (ENTERIC COATED) ORAL
Qty: 30 TABLET | Refills: 4 | Status: SHIPPED | OUTPATIENT
Start: 2025-02-28

## 2025-02-28 RX ORDER — ERGOCALCIFEROL 1.25 MG/1
50000 CAPSULE, LIQUID FILLED ORAL WEEKLY
Qty: 12 CAPSULE | Refills: 4 | Status: SHIPPED | OUTPATIENT
Start: 2025-02-28

## 2025-03-01 LAB
CCP IGA+IGG SERPL IA-ACNC: 5 UNITS (ref 0–19)
RHEUMATOID FACT SERPL-ACNC: 10.5 IU/ML

## 2025-03-07 LAB — SCL-70 AB: <20 UNITS

## 2025-04-27 DIAGNOSIS — I10 PRIMARY HYPERTENSION: ICD-10-CM

## 2025-06-15 DIAGNOSIS — I10 PRIMARY HYPERTENSION: ICD-10-CM

## 2025-06-16 RX ORDER — LOSARTAN POTASSIUM AND HYDROCHLOROTHIAZIDE 12.5; 5 MG/1; MG/1
1 TABLET ORAL 2 TIMES DAILY
Qty: 60 TABLET | Refills: 0 | Status: SHIPPED | OUTPATIENT
Start: 2025-06-16

## 2025-06-16 RX ORDER — LOSARTAN POTASSIUM AND HYDROCHLOROTHIAZIDE 12.5; 5 MG/1; MG/1
1 TABLET ORAL DAILY
Qty: 90 TABLET | Refills: 1 | OUTPATIENT
Start: 2025-06-16

## 2025-07-13 DIAGNOSIS — I10 PRIMARY HYPERTENSION: ICD-10-CM

## 2025-07-14 RX ORDER — LOSARTAN POTASSIUM AND HYDROCHLOROTHIAZIDE 12.5; 5 MG/1; MG/1
TABLET ORAL
Qty: 180 TABLET | Refills: 1 | Status: SHIPPED | OUTPATIENT
Start: 2025-07-14

## 2025-07-22 DIAGNOSIS — I10 PRIMARY HYPERTENSION: ICD-10-CM

## 2025-07-22 RX ORDER — LOSARTAN POTASSIUM AND HYDROCHLOROTHIAZIDE 12.5; 5 MG/1; MG/1
1 TABLET ORAL DAILY
Qty: 90 TABLET | Refills: 1 | Status: SHIPPED | OUTPATIENT
Start: 2025-07-22

## 2025-08-04 DIAGNOSIS — I10 PRIMARY HYPERTENSION: ICD-10-CM

## 2025-08-04 RX ORDER — LOSARTAN POTASSIUM AND HYDROCHLOROTHIAZIDE 12.5; 5 MG/1; MG/1
1 TABLET ORAL 2 TIMES DAILY
Qty: 180 TABLET | Refills: 2 | Status: SHIPPED | OUTPATIENT
Start: 2025-08-04

## 2025-08-11 DIAGNOSIS — I10 PRIMARY HYPERTENSION: Primary | ICD-10-CM

## 2025-08-11 RX ORDER — LOSARTAN POTASSIUM AND HYDROCHLOROTHIAZIDE 25; 100 MG/1; MG/1
1 TABLET ORAL DAILY
Qty: 90 TABLET | Refills: 1 | Status: SHIPPED | OUTPATIENT
Start: 2025-08-11

## (undated) DEVICE — CUFF BLD PRSS AD CLTH SGL TB W/ BAYNT CONN ROUNDED CORNER

## (undated) DEVICE — SET GRAV CK VLV NEEDLESS ST 3 GANGED 4WAY STPCOCK HI FLO 10

## (undated) DEVICE — IV START KIT: Brand: MEDLINE

## (undated) DEVICE — ENDOSCOPIC KIT COMPLIANCE ENDOKIT

## (undated) DEVICE — TIP SUCT TRNSPAR RIB SURF STD BLB RIG NVENT W/ 5IN1 CONN DYND50138] MEDLINE INDUSTRIES INC]